# Patient Record
Sex: MALE | Race: WHITE
[De-identification: names, ages, dates, MRNs, and addresses within clinical notes are randomized per-mention and may not be internally consistent; named-entity substitution may affect disease eponyms.]

---
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
## 2022-01-01 ENCOUNTER — HOSPITAL ENCOUNTER (EMERGENCY)
Dept: HOSPITAL 75 - ER FS | Age: 22
Discharge: HOME | End: 2022-01-01
Payer: MEDICAID

## 2022-01-01 VITALS — BODY MASS INDEX: 31.41 KG/M2 | HEIGHT: 75.98 IN | WEIGHT: 257.94 LBS

## 2022-01-01 VITALS — SYSTOLIC BLOOD PRESSURE: 146 MMHG | DIASTOLIC BLOOD PRESSURE: 82 MMHG

## 2022-01-01 DIAGNOSIS — K52.9: ICD-10-CM

## 2022-01-01 DIAGNOSIS — K29.70: Primary | ICD-10-CM

## 2022-01-01 DIAGNOSIS — Z20.822: ICD-10-CM

## 2022-01-01 DIAGNOSIS — F17.210: ICD-10-CM

## 2022-01-01 LAB
ALBUMIN SERPL-MCNC: 4.5 GM/DL (ref 3.2–4.5)
ALP SERPL-CCNC: 78 U/L (ref 40–136)
ALT SERPL-CCNC: 27 U/L (ref 0–55)
BASOPHILS # BLD AUTO: 0 10^3/UL (ref 0–0.1)
BASOPHILS NFR BLD AUTO: 0 % (ref 0–10)
BILIRUB SERPL-MCNC: 0.8 MG/DL (ref 0.1–1)
BUN/CREAT SERPL: 13
CALCIUM SERPL-MCNC: 9.5 MG/DL (ref 8.5–10.1)
CHLORIDE SERPL-SCNC: 105 MMOL/L (ref 98–107)
CO2 SERPL-SCNC: 25 MMOL/L (ref 21–32)
CREAT SERPL-MCNC: 0.94 MG/DL (ref 0.6–1.3)
EOSINOPHIL # BLD AUTO: 0.2 10^3/UL (ref 0–0.3)
EOSINOPHIL NFR BLD AUTO: 2 % (ref 0–10)
GFR SERPLBLD BASED ON 1.73 SQ M-ARVRAT: 103 ML/MIN
GLUCOSE SERPL-MCNC: 94 MG/DL (ref 70–105)
HCT VFR BLD CALC: 44 % (ref 40–54)
HGB BLD-MCNC: 15.2 G/DL (ref 13.3–17.7)
LIPASE SERPL-CCNC: 30 U/L (ref 8–78)
LYMPHOCYTES # BLD AUTO: 1.8 X 10^3 (ref 1–4)
LYMPHOCYTES NFR BLD AUTO: 27 % (ref 12–44)
MANUAL DIFFERENTIAL PERFORMED BLD QL: NO
MCH RBC QN AUTO: 29 PG (ref 25–34)
MCHC RBC AUTO-ENTMCNC: 34 G/DL (ref 32–36)
MCV RBC AUTO: 85 FL (ref 80–99)
MONOCYTES # BLD AUTO: 0.5 X 10^3 (ref 0–1)
MONOCYTES NFR BLD AUTO: 7 % (ref 0–12)
NEUTROPHILS # BLD AUTO: 4.2 X 10^3 (ref 1.8–7.8)
NEUTROPHILS NFR BLD AUTO: 62 % (ref 42–75)
PLATELET # BLD: 287 10^3/UL (ref 130–400)
PMV BLD AUTO: 9.8 FL (ref 9–12.2)
POTASSIUM SERPL-SCNC: 4.2 MMOL/L (ref 3.6–5)
PROT SERPL-MCNC: 7.7 GM/DL (ref 6.4–8.2)
SODIUM SERPL-SCNC: 141 MMOL/L (ref 135–145)
WBC # BLD AUTO: 6.8 10^3/UL (ref 4.3–11)

## 2022-01-01 PROCEDURE — 36415 COLL VENOUS BLD VENIPUNCTURE: CPT

## 2022-01-01 PROCEDURE — 85025 COMPLETE CBC W/AUTO DIFF WBC: CPT

## 2022-01-01 PROCEDURE — 83690 ASSAY OF LIPASE: CPT

## 2022-01-01 PROCEDURE — 87636 SARSCOV2 & INF A&B AMP PRB: CPT

## 2022-01-01 PROCEDURE — 86141 C-REACTIVE PROTEIN HS: CPT

## 2022-01-01 PROCEDURE — 87804 INFLUENZA ASSAY W/OPTIC: CPT

## 2022-01-01 PROCEDURE — 87635 SARS-COV-2 COVID-19 AMP PRB: CPT

## 2022-01-01 PROCEDURE — 80053 COMPREHEN METABOLIC PANEL: CPT

## 2022-01-01 NOTE — ED GENERAL
General


Chief Complaint:  General Problems/Pain


Stated Complaint:  ABD PAIN; NAUSEA; SUTURE REMOVAL





History of Present Illness


Date Seen by Provider:  Jan 1, 2022


Time Seen by Provider:  16:06


Initial Comments


21-year-old male presents with some epigastric discomfort and nausea.  He has 

had a couple episodes of vomiting.  Patient was seen here about 4 5 days ago and

told he had a viral illness and gastritis.  He presents because he continued 

have some issues.  He has Zofran that was prescribed.  He also described a 

little bit of diarrhea with it.  Patient has no reports of fever chills cough or

other systemic complaints





Allergies and Home Medications


Allergies


Coded Allergies:  


     No Known Drug Allergies (Unverified , 12/22/21)





Patient Home Medication List


Home Medication List Reviewed:  Yes


Famotidine (Pepcid) 20 Mg Tablet, 20 MG PO BID


   Prescribed by: TUCKER SAVAGE on 12/26/21 1533


Ondansetron (Ondansetron Odt) 4 Mg Tab.rapdis, 4 MG PO Q6H


   Prescribed by: TUCKER SAVAGE on 12/26/21 1533





Review of Systems


Review of Systems


Constitutional:  No chills, No fever


Respiratory:  No cough, No short of breath


Gastrointestinal:  diarrhea, nausea, vomiting


Genitourinary:  no symptoms reported


Musculoskeletal:  no symptoms reported


Skin:  no symptoms reported


Psychiatric/Neurological:  No Symptoms Reported


Hematologic/Lymphatic:  No Symptoms Reported





Past Medical-Social-Family Hx


Patient Social History


Tobacco Use?:  Yes


Tobacco type used:  Cigarettes


Smoking Status:  Current Everyday Smoker


Use of E-Cig and/or Vaping dev:  No


Substance use?:  No


Alcohol Use?:  No





Immunizations Up To Date


First/Initial COVID19 Vaccinat:  Not currently vaccinated





Past Medical History


Surgery/Hospitalization HX:  


Hernia repair





Physical Exam


Vital Signs





Vital Signs - First Documented








 1/1/22





 16:00


 


Temp 36.6


 


Pulse 82


 


B/P (MAP) 153/96 (115)





Capillary Refill :


Height, Weight, BMI


Height: '"


Weight: lbs. oz. kg; 31.00 BMI


Method:


General Appearance:  No Apparent Distress, WD/WN


Neck:  Non Tender, Supple


Respiratory:  Lungs Clear, Normal Breath Sounds


Cardiovascular:  Regular Rate, Rhythm, No Edema


Gastrointestinal:  Non Tender, Soft


Extremity:  Normal Capillary Refill, Normal Inspection, Normal Range of Motion


Neurologic/Psychiatric:  Alert, Oriented x3, No Motor/Sensory Deficits





Procedures/Interventions





   Suture Size:  4-0





Progress/Results/Core Measures


Suspected Sepsis


SIRS


Temperature: 


Pulse:  


Respiratory Rate: 


 


Laboratory Tests


1/1/22 16:19: White Blood Count 6.8


Blood Pressure  / 


Mean: 


 





Laboratory Tests


1/1/22 16:19: 


Creatinine 0.94, Platelet Count 287, Total Bilirubin 0.8








Results/Orders


Lab Results





Laboratory Tests








Test


 1/1/22


16:19 Range/Units


 


 


White Blood Count


 6.8 


 4.3-11.0


10^3/uL


 


Red Blood Count


 5.23 


 4.30-5.52


10^6/uL


 


Hemoglobin 15.2  13.3-17.7  g/dL


 


Hematocrit 44  40-54  %


 


Mean Corpuscular Volume 85  80-99  fL


 


Mean Corpuscular Hemoglobin 29  25-34  pg


 


Mean Corpuscular Hemoglobin


Concent 34 


 32-36  g/dL





 


Red Cell Distribution Width 12.9  10.0-14.5  %


 


Platelet Count


 287 


 130-400


10^3/uL


 


Mean Platelet Volume 9.8  9.0-12.2  fL


 


Immature Granulocyte % (Auto) 0   %


 


Neutrophils (%) (Auto) 62  42-75  %


 


Lymphocytes (%) (Auto) 27  12-44  %


 


Monocytes (%) (Auto) 7  0-12  %


 


Eosinophils (%) (Auto) 2  0-10  %


 


Basophils (%) (Auto) 0  0-10  %


 


Neutrophils # (Auto) 4.2  1.8-7.8  X 10^3


 


Lymphocytes # (Auto) 1.8  1.0-4.0  X 10^3


 


Monocytes # (Auto) 0.5  0.0-1.0  X 10^3


 


Eosinophils # (Auto)


 0.2 


 0.0-0.3


10^3/uL


 


Basophils # (Auto)


 0.0 


 0.0-0.1


10^3/uL


 


Immature Granulocyte # (Auto)


 0.0 


 0.0-0.1


10^3/uL


 


Sodium Level 141  135-145  MMOL/L


 


Potassium Level 4.2  3.6-5.0  MMOL/L


 


Chloride Level 105    MMOL/L


 


Carbon Dioxide Level 25  21-32  MMOL/L


 


Anion Gap 11  5-14  MMOL/L


 


Blood Urea Nitrogen 12  7-18  MG/DL


 


Creatinine


 0.94 


 0.60-1.30


MG/DL


 


Estimat Glomerular Filtration


Rate 103 


  





 


BUN/Creatinine Ratio 13   


 


Glucose Level 94    MG/DL


 


Calcium Level 9.5  8.5-10.1  MG/DL


 


Corrected Calcium 9.1  8.5-10.1  MG/DL


 


Total Bilirubin 0.8  0.1-1.0  MG/DL


 


Aspartate Amino Transf


(AST/SGOT) 18 


 5-34  U/L





 


Alanine Aminotransferase


(ALT/SGPT) 27 


 0-55  U/L





 


Alkaline Phosphatase 78    U/L


 


C-Reactive Protein < 0.30  <0.50  MG/DL


 


Total Protein 7.7  6.4-8.2  GM/DL


 


Albumin 4.5  3.2-4.5  GM/DL


 


Lipase 30  8-78  U/L


 


Influenza Type A Antigen NEGATIVE  NEGATIVE  


 


Influenza Type B Antigen NEGATIVE  NEGATIVE  








My Orders





Orders - CLEVE ALONZO DO


Cbc With Automated Diff (1/1/22 16:10)


Comprehensive Metabolic Panel (1/1/22 16:10)


Covid 19 Inhouse Test (1/1/22 16:10)


Influenza A & B Antigens (1/1/22 16:10)


Lipase (1/1/22 16:10)


Ns Iv 1000 Ml (Sodium Chloride 0.9%) (1/1/22 16:10)


Famotidine Injection (Pepcid Injection) (1/1/22 16:10)


Crp Fs (1/1/22 16:19)





Vital Signs/I&O











 1/1/22





 16:00


 


Temp 36.6


 


Pulse 82


 


B/P (MAP) 153/96 (115)





Capillary Refill :


Progress Note :  


Progress Note


Patient's labs show no acute changes.  Patient exam was benign.  Suspect he has 

reflux or a viral gastroenteritis.  He should continue his prescribed Pepcid.  

Patient stable and discharged home





Departure


Impression





   Primary Impression:  


   Gastritis


   Qualified Codes:  K29.00 - Acute gastritis without bleeding


   Additional Impression:  


   Gastroenteritis


Disposition:  01 HOME, SELF-CARE


Condition:  Stable





Departure-Patient Inst.


Referrals:  


NO,LOCAL PHYSICIAN (PCP/Family)


Primary Care Physician


Patient Instructions:  Gastritis (DC), Viral Gastroenteritis, Adult (DC)





Add. Discharge Instructions:  


Continue your prescribed medication from your previous visit.  It can take up to

10 to 14 days to take full effect


Please establish care with a primary care provider for further outpatient 

management and possible GI consult as needed





All discharge instructions reviewed with patient and/or family. Voiced 

understanding.











CLEVE ALONZO DO                Jan 1, 2022 16:06

## 2023-08-06 ENCOUNTER — HOSPITAL ENCOUNTER (EMERGENCY)
Dept: HOSPITAL 75 - ER FS | Age: 23
Discharge: HOME | End: 2023-08-06
Payer: SELF-PAY

## 2023-08-06 VITALS — WEIGHT: 235.01 LBS | HEIGHT: 76.3 IN | BODY MASS INDEX: 28.32 KG/M2

## 2023-08-06 VITALS — DIASTOLIC BLOOD PRESSURE: 94 MMHG | SYSTOLIC BLOOD PRESSURE: 166 MMHG

## 2023-08-06 DIAGNOSIS — F17.210: ICD-10-CM

## 2023-08-06 DIAGNOSIS — Z98.890: ICD-10-CM

## 2023-08-06 DIAGNOSIS — F17.290: ICD-10-CM

## 2023-08-06 DIAGNOSIS — L02.211: Primary | ICD-10-CM

## 2023-08-06 DIAGNOSIS — Z28.310: ICD-10-CM

## 2023-08-06 LAB
ALBUMIN SERPL-MCNC: 4.5 GM/DL (ref 3.2–4.5)
ALP SERPL-CCNC: 85 U/L (ref 40–136)
ALT SERPL-CCNC: 17 U/L (ref 0–55)
BASOPHILS # BLD AUTO: 0 10^3/UL (ref 0–0.1)
BASOPHILS NFR BLD AUTO: 0 % (ref 0–10)
BILIRUB SERPL-MCNC: 0.9 MG/DL (ref 0.1–1)
BUN/CREAT SERPL: 10
CALCIUM SERPL-MCNC: 9.8 MG/DL (ref 8.5–10.1)
CHLORIDE SERPL-SCNC: 98 MMOL/L (ref 98–107)
CO2 SERPL-SCNC: 23 MMOL/L (ref 21–32)
CREAT SERPL-MCNC: 1.25 MG/DL (ref 0.6–1.3)
EOSINOPHIL # BLD AUTO: 0.1 10^3/UL (ref 0–0.3)
EOSINOPHIL NFR BLD AUTO: 1 % (ref 0–10)
GFR SERPLBLD BASED ON 1.73 SQ M-ARVRAT: 84 ML/MIN
GLUCOSE SERPL-MCNC: 100 MG/DL (ref 70–105)
HCT VFR BLD CALC: 45 % (ref 40–54)
HGB BLD-MCNC: 15 G/DL (ref 13.3–17.7)
LYMPHOCYTES # BLD AUTO: 2.4 10^3/UL (ref 1–4)
LYMPHOCYTES NFR BLD AUTO: 16 % (ref 12–44)
MANUAL DIFFERENTIAL PERFORMED BLD QL: YES
MCH RBC QN AUTO: 29 PG (ref 25–34)
MCHC RBC AUTO-ENTMCNC: 34 G/DL (ref 32–36)
MCV RBC AUTO: 85 FL (ref 80–99)
MONOCYTES # BLD AUTO: 1.4 10^3/UL (ref 0–1)
MONOCYTES NFR BLD AUTO: 9 % (ref 0–12)
MONOCYTES NFR BLD: 7 %
NEUTROPHILS # BLD AUTO: 11.1 10^3/UL (ref 1.8–7.8)
NEUTROPHILS NFR BLD AUTO: 74 % (ref 42–75)
NEUTS BAND NFR BLD MANUAL: 78 %
PLATELET # BLD: 225 10^3/UL (ref 130–400)
PMV BLD AUTO: 9.6 FL (ref 9–12.2)
POTASSIUM SERPL-SCNC: 4.2 MMOL/L (ref 3.6–5)
PROT SERPL-MCNC: 7.8 GM/DL (ref 6.4–8.2)
SODIUM SERPL-SCNC: 134 MMOL/L (ref 135–145)
VARIANT LYMPHS NFR BLD MANUAL: 15 %
WBC # BLD AUTO: 15 10^3/UL (ref 4.3–11)

## 2023-08-06 PROCEDURE — 85027 COMPLETE CBC AUTOMATED: CPT

## 2023-08-06 PROCEDURE — 85007 BL SMEAR W/DIFF WBC COUNT: CPT

## 2023-08-06 PROCEDURE — 36415 COLL VENOUS BLD VENIPUNCTURE: CPT

## 2023-08-06 PROCEDURE — 87070 CULTURE OTHR SPECIMN AEROBIC: CPT

## 2023-08-06 PROCEDURE — 10061 I&D ABSCESS COMP/MULTIPLE: CPT

## 2023-08-06 PROCEDURE — 80053 COMPREHEN METABOLIC PANEL: CPT

## 2023-08-06 PROCEDURE — 87205 SMEAR GRAM STAIN: CPT

## 2023-08-06 PROCEDURE — 74177 CT ABD & PELVIS W/CONTRAST: CPT

## 2023-08-06 NOTE — ED INTEGUMENTARY GENERAL
General


Chief Complaint:  Skin/Wound Problems


Stated Complaint:  INFECTED BELLY BUTTON


Source:  patient, RN/MD


Exam Limitations:  no limitations





History of Present Illness


Date Seen by Provider:  Aug 6, 2023


Time Seen by Provider:  16:37


Initial Comments


23-year-old male with no pertinent past medical history coming in as a referral 

from urgent care due to concern for an abdominal wall abscess.  It formed 

roughly 5 days ago, went to the urgent care on 3 August and had an incision and 

drainage.  He states there was hardly any drainage at all.  He was started on 

Bactrim and has been taking that.  It has been getting worse, so he went back to

the urgent care today, and they referred him to the ER.  He is unsure if he has 

had a fever.  He is otherwise denying any other acute complaints.  This has 

never occurred before.





Allergies and Home Medications


Allergies


Coded Allergies:  


     No Known Drug Allergies (Unverified , 21)





Patient Home Medication List


Home Medication List Reviewed:  Yes


Clindamycin HCl (Clindamycin HCl) 300 Mg Capsule, 300 MG PO QID


   Prescribed by: TAYLOR OLIVAREZ on 23 175


Famotidine (Pepcid) 20 Mg Tablet, 20 MG PO BID


   Prescribed by: TUCKER SAVAGE on 21 1533


Ondansetron (Ondansetron Odt) 4 Mg Tab.rapdis, 4 MG PO Q6H


   Prescribed by: TUCKER SAVAGE on 21 1533





Review of Systems


Review of Systems


Constitutional:  No fever


EENTM:  no symptoms reported


Respiratory:  no symptoms reported


Cardiovascular:  no symptoms reported


Gastrointestinal:  no symptoms reported


Genitourinary:  no symptoms reported


Musculoskeletal:  no symptoms reported


Skin:  see HPI


Psychiatric/Neurological:  No Symptoms Reported


Endocrine:  No Symptoms Reported





Past Medical-Social-Family Hx


Patient Social History


Tobacco Use?:  Yes


Tobacco type used:  Cigarettes


Use of E-Cig and/or Vaping dev:  Yes


E-Cig or Vaping type used:  Nicotine





Immunizations Up To Date


First/Initial COVID19 Vaccinat:  Not currently vaccinated





Past Medical History


Surgery/Hospitalization HX:  


Hernia repair





Physical Exam


Vital Signs





Vital Signs - First Documented








 23





 16:38


 


Temp 37.8


 


Pulse 92


 


Resp 16


 


B/P (MAP) 166/94 (118)


 


O2 Delivery Room Air





Capillary Refill :


General Appearance:  WD/WN, no apparent distress


HEENT:  PERRL/EOMI, normal ENT inspection, pharynx normal


Neck:  non-tender, full range of motion, supple, normal inspection


Cardiovascular:  regular rate, rhythm, no edema, no murmur


Respiratory:  chest non-tender, lungs clear, normal breath sounds, no 

respiratory distress, no accessory muscle use


Gastrointestinal:  normal bowel sounds, soft, other (Periumbilical abscess with 

fluctuance and overlying cellulitis and induration)


Back:  normal inspection


Extremities:  normal range of motion, non-tender, normal inspection, no pedal 

edema, no calf tenderness, normal capillary refill


Neurologic/Psychiatric:  no motor/sensory deficits, alert, normal mood/affect





Procedures/Interventions


I&D :  


   Site:  umbilical


   Blade Size:  11


   I & D Procedure:  Wound Packing


   Packing/Drain:  Idoform 


Progress


The area was cleaned with chlorhexidine and allowed to dry.  It was anesthetized

with a 27-gauge needle and 2% lidocaine with epinephrine.  7 cc was used of the 

lidocaine.  I performed a stab incision that was lengthened to just roughly 2 

cm.  There is a very large amount of purulent drainage.  The area was probed and

deloculated with forceps after with more drainage.  It was packed with iodoform 

gauze.  Patient tolerated the procedure well.





   Suture Size:  4-0





Progress/Results/Core Measures


Results/Orders


Lab Results





Laboratory Tests








Test


 23


16:44 Range/Units


 


 


White Blood Count


 15.0 H


 4.3-11.0


10^3/uL


 


Red Blood Count


 5.21 


 4.30-5.52


10^6/uL


 


Hemoglobin 15.0  13.3-17.7  g/dL


 


Hematocrit 45  40-54  %


 


Mean Corpuscular Volume 85  80-99  fL


 


Mean Corpuscular Hemoglobin 29  25-34  pg


 


Mean Corpuscular Hemoglobin


Concent 34 


 32-36  g/dL





 


Red Cell Distribution Width 12.5  10.0-14.5  %


 


Platelet Count


 225 


 130-400


10^3/uL


 


Mean Platelet Volume 9.6  9.0-12.2  fL


 


Immature Granulocyte % (Auto) 0   %


 


Neutrophils (%) (Auto) 74  42-75  %


 


Lymphocytes (%) (Auto) 16  12-44  %


 


Monocytes (%) (Auto) 9  0-12  %


 


Eosinophils (%) (Auto) 1  0-10  %


 


Basophils (%) (Auto) 0  0-10  %


 


Neutrophils # (Auto)


 11.1 H


 1.8-7.8


10^3/uL


 


Lymphocytes # (Auto)


 2.4 


 1.0-4.0


10^3/uL


 


Monocytes # (Auto)


 1.4 H


 0.0-1.0


10^3/uL


 


Eosinophils # (Auto)


 0.1 


 0.0-0.3


10^3/uL


 


Basophils # (Auto)


 0.0 


 0.0-0.1


10^3/uL


 


Immature Granulocyte # (Auto)


 0.0 


 0.0-0.1


10^3/uL


 


Neutrophils % (Manual) 78   %


 


Lymphocytes % (Manual) 15   %


 


Monocytes % (Manual) 7   %


 


Sodium Level 134 L 135-145  MMOL/L


 


Potassium Level 4.2  3.6-5.0  MMOL/L


 


Chloride Level 98    MMOL/L


 


Carbon Dioxide Level 23  21-32  MMOL/L


 


Anion Gap 13  5-14  MMOL/L


 


Blood Urea Nitrogen 13  7-18  MG/DL


 


Creatinine


 1.25 


 0.60-1.30


MG/DL


 


Estimat Glomerular Filtration


Rate 84 


  





 


BUN/Creatinine Ratio 10   


 


Glucose Level 100    MG/DL


 


Calcium Level 9.8  8.5-10.1  MG/DL


 


Corrected Calcium 9.4  8.5-10.1  MG/DL


 


Total Bilirubin 0.9  0.1-1.0  MG/DL


 


Aspartate Amino Transf


(AST/SGOT) 19 


 5-34  U/L





 


Alanine Aminotransferase


(ALT/SGPT) 17 


 0-55  U/L





 


Alkaline Phosphatase 85    U/L


 


Total Protein 7.8  6.4-8.2  GM/DL


 


Albumin 4.5  3.2-4.5  GM/DL








My Orders





Orders - TAYLOR OLIVAREZ MD


Cbc With Automated Diff (23 16:42)


Comprehensive Metabolic Panel (23 16:42)


Ct Abdomen/Pelvis W (23 16:42)


Ibuprofen  Tablet (Motrin  Tablet) (23 16:45)


Iohexol Injection (Omnipaque 350 Mg/Ml 1 (23 16:45)


Received Contrast (Hold Metformin- Contr (23 16:45)


Ns (Ivpb) 100 Ml (Sodium Chloride 0.9% 1 (23 16:45)


Manual Differential (23 16:44)





Medications Given in ED





Current Medications








 Medications  Dose


 Ordered  Sig/Kathy


 Route  Start Time


 Stop Time Status Last Admin


Dose Admin


 


 Ibuprofen  600 mg  ONCE  ONCE


 PO  23 16:45


 23 16:46 DC 23 17:07


600 MG


 


 Iohexol  100 ml  ONCE  ONCE


 IV  23 16:45


 23 16:46 DC 23 17:07


80 ML


 


 Sodium Chloride  100 ml  ONCE  ONCE


 IV  23 16:45


 23 16:46 DC 23 17:08


80 ML








Vital Signs/I&O











 23





 16:38


 


Temp 37.8


 


Pulse 92


 


Resp 16


 


B/P (MAP) 166/94 (118)


 


O2 Delivery Room Air











Progress


Progress Note :  


Progress Note


23-year-old male with above history coming in as a referral from the urgent care

due to an abdominal wall abscess.  ABCs were intact and vitals were stable on 

presentation although he is febrile.  He was given ibuprofen for pain here.  An 

IV was placed and basic labs were obtained and were significant for 

leukocytosis, normal creatinine, unremarkable electrolytes.  CT abdomen pelvis 

ordered and interpreted by me showing a periumbilical abscess that does not go 

beyond the skin.  The area was cleansed, anesthetized, and a single stab 

incision that was lengthened to 2 cm with a large amount of purulent drainage 

noted.  The area was probed and deloculated with forceps with more drainage.  It

was then packed with half-inch iodoform gauze.  He clearly has overlying 

cellulitis on the abdomen which is not extending too far, he only has 1 more day

of antibiotics.  I will send a prescription for a new antibiotic.  A culture was

also sent.  I believe he is stable for discharge with outpatient follow-up.  He 

was sent home with strict return precautions.





Diagnostic Imaging





   Diagonstic Imaging:  CT (abd/pelvis)


Comments


                 [facility] [place], [place]





NAME:   GEORGE DOSS


[facility] REC#:   [id number]


ACCOUNT#:   [id number]


PT STATUS:   REG ER


:   2000


PHYSICIAN:   TAYLOR OLIVAREZ MD


ADMIT DATE:   23/ER FS


                                   ***Draft***


Date of Exam:23





CT ABDOMEN/PELVIS W








EXAMINATION: CT abdomen and pelvis with intravenous contrast.





TECHNIQUE: Multiple contiguous axial images were obtained through


the abdomen and pelvis after the uneventful administration of


intravenous contrast. All CT scans use one or more of the


following dose optimizing techniques: automated exposure control,


MA and/or KvP adjustment based on patient size and exam type or


iterative reconstruction. 





HISTORY: Abdominal wall abnormality.





COMPARISON: None available.





FINDINGS: 





Lung bases: The lung bases are clear.





Solid organs: The liver is normal without focal lesion. The


gallbladder is normal. There is no biliary ductal dilation.


Pancreas is normal. Spleen is normal. Adrenal glands are normal.


There are bilateral nonobstructing renal calculi measuring up to


0.2 cm. No hydronephrosis.





Bowel: The stomach and small bowel are normal without


obstruction. The colon is normal. The appendix is normal.





Peritoneum: There is no intraperitoneal free fluid or free air.


No suspicious lymphadenopathy. 





Vasculature: Normal without aneurysm.





Musculoskeletal: No suspicious osseous lesion or compression


fracture. There is a periumbilical fluid collection with


surrounding inflammatory stranding. This measures 3.8 x 3.5 cm.





Pelvis: The prostate gland is normal. The urinary bladder is


normal.





IMPRESSION:


1. Periumbilical loculated fluid collection with surrounding


inflammation measuring up to 3.8 x 3.5 cm.


2. Bilateral nonobstructing renal calculi. 





  Dictated on workstation # [id number]








Dict:   23


Trans:   23 171


[facility] [id number]





Interpreted by:     EDGAR BLANCA DO


Electronically signed by:





Departure


Impression





   Primary Impression:  


   Abdominal wall abscess


Disposition:  01 HOME, SELF-CARE


Condition:  Stable





Departure-Patient Inst.


Decision time for Depature:  18:00


Referrals:  


[facility]/ELIU HALE,LOCAL PHYSICIAN (PCP)


Primary Care Physician


Patient Instructions:  Abscess Incision and Drainage





Add. Discharge Instructions:  


We will put you on a different antibiotic for the next week.  Take ibuprofen as 

needed for pain.  Take the packing out tomorrow night and then cover it back up 

with gauze for another day as it likely will bleed some more.  After that, just 

allow it to drain naturally and closed back up.  If it worsens again, we would 

recommend following up with an ER.  Please contact [facility], the number 

in this paperwork to schedule an appointment for primary care so you will have 

someone that sees you regularly.


Scripts


Clindamycin HCl (Clindamycin HCl) 300 Mg Capsule


300 MG PO QID for 7 Days, #28 CAP


   Prov: TAYLOR OLIVAREZ MD         23


Work/School Note:  Work Release Form   Date Seen in the Emergency Department:  

Aug 6, 2023


   Return to Work:  Aug 7, 2023


   Restrictions:  No Restrictions











TAYLOR OLIVAREZ MD           Aug 6, 2023 16:47

## 2023-08-06 NOTE — DIAGNOSTIC IMAGING REPORT
EXAMINATION: CT abdomen and pelvis with intravenous contrast.



TECHNIQUE: Multiple contiguous axial images were obtained through

the abdomen and pelvis after the uneventful administration of

intravenous contrast. All CT scans use one or more of the

following dose optimizing techniques: automated exposure control,

MA and/or KvP adjustment based on patient size and exam type or

iterative reconstruction. 



HISTORY: Abdominal wall abnormality.



COMPARISON: None available.



FINDINGS: 



Lung bases: The lung bases are clear.



Solid organs: The liver is normal without focal lesion. The

gallbladder is normal. There is no biliary ductal dilation.

Pancreas is normal. Spleen is normal. Adrenal glands are normal.

There are bilateral nonobstructing renal calculi measuring up to

0.2 cm. No hydronephrosis.



Bowel: The stomach and small bowel are normal without

obstruction. The colon is normal. The appendix is normal.



Peritoneum: There is no intraperitoneal free fluid or free air.

No suspicious lymphadenopathy. 



Vasculature: Normal without aneurysm.



Musculoskeletal: No suspicious osseous lesion or compression

fracture. There is a periumbilical fluid collection with

surrounding inflammatory stranding. This measures 3.8 x 3.5 cm.



Pelvis: The prostate gland is normal. The urinary bladder is

normal.



IMPRESSION:

1. Periumbilical loculated fluid collection with surrounding

inflammation measuring up to 3.8 x 3.5 cm.

2. Bilateral nonobstructing renal calculi. 



Dictated by: 



  Dictated on workstation # [id number]